# Patient Record
Sex: FEMALE | Race: ASIAN | Employment: OTHER | URBAN - METROPOLITAN AREA
[De-identification: names, ages, dates, MRNs, and addresses within clinical notes are randomized per-mention and may not be internally consistent; named-entity substitution may affect disease eponyms.]

---

## 2017-08-21 ENCOUNTER — HOSPITAL ENCOUNTER (OUTPATIENT)
Dept: PHYSICAL THERAPY | Age: 72
Discharge: HOME | End: 2017-08-21
Payer: MEDICARE

## 2017-08-21 PROCEDURE — 97110 THERAPEUTIC EXERCISES: CPT | Performed by: PHYSICAL THERAPIST

## 2017-08-21 PROCEDURE — 97162 PT EVAL MOD COMPLEX 30 MIN: CPT | Performed by: PHYSICAL THERAPIST

## 2017-08-21 PROCEDURE — G8978 MOBILITY CURRENT STATUS: HCPCS | Performed by: PHYSICAL THERAPIST

## 2017-08-21 PROCEDURE — G8979 MOBILITY GOAL STATUS: HCPCS | Performed by: PHYSICAL THERAPIST

## 2017-08-21 NOTE — PROGRESS NOTES
PT DAILY TREATMENT NOTE/LUMBAR EVAL 3-16    Patient Name: Jonelle Farooq  Date:2017  : 1945  [x]  Patient  Verified  Payor: [place] MED[place] / Plan: [place] MEDICARE PART A & B / Product Type: Medicare /    In time:11:06  Out time:11:51  Total Treatment Time (min): 45  Total Timed Codes (min): 45  1:1 Treatment Time ( only): 39   Visit #: 1 of 12    Treatment Area: Low back pain [M54.5]  SUBJECTIVE  Pain Level (0-10 scale): 5 (ranges from 3-8)  [x]constant []intermittent []improving []worsening []no change since onset    Any medication changes, allergies to medications, adverse drug reactions, diagnosis change, or new procedure performed?: [x] No    [] Yes (see summary sheet for update)  Subjective functional status/changes:     PLOF: Independent, used to walk for exercises. Limitations to PLOF: Unable to stand or walk for extended periods secondary to increase in pain bilateral feet and low back. Mechanism of Injury: fell down the stairs a year ago and had a fracture at L4 that is healed. However, feet pain began this past December. Current symptoms/Complaints:   -Standing increases back pain and feet pain. - Radiates down the front of legs  - Left side > right. - Toothache, deep ache. - Numbness and tingling positive  - 2 disc herniations and fracture at L4.  - Back pain began following a fall down stairs 2016 however feet pain began in 2016.   -Has not seen an MD yet for this issue, they just read MRI results and said it was stemming from the low back.  -Patient reports that laying down or sitting will decrease back pain.   -Foot pain is constant. Previous Treatment/Compliance: Never had PT before for back. PMHx/Surgical Hx:  39 years ago. Work Hx: Patient takes care of grandchildren and does housework. Living Situation: Lives with   Pt Goals: \"To decrease pain levels. \"  Barriers: [x]pain []financial []time []transportation []other  Motivation: Good  Substance use: []Alcohol []Tobacco []other:   FABQ Score: []low [x]elevate  Cognition: A & O x 4    Other:    OBJECTIVE/EXAMINATION  Activity/Recreational Limitations: Taking care of grandchildren, house work. 35 min [x]Eval                  []Re-Eval       15 min Therapeutic Exercise:  [x] See flow sheet :   Rationale: increase ROM, increase strength and improve coordination to improve the patients ability to perform daily activities with decreased pain and symptom levels           With   [x] TE   [] TA   [] neuro   [] other: Patient Education: [x] Review HEP    [] Progressed/Changed HEP based on:   [] positioning   [x] body mechanics   [] transfers   [] heat/ice application    [] other:      Other Objective/Functional Measures: FOTO: 53    Physical Therapy Evaluation - Lumbar Spine ([facility])      General Health:  Red Flags Indicated? [] Yes    [x] No  [] Yes [] No Recent weight change (If yes, due to dieting? [] Yes  [] No)   [] Yes [] No Weakness in legs during walking  [] Yes [] No Unremitting pain at night  [] Yes [] No Abdominal pain or problems  [] Yes [] No Rectal bleeding  [] Yes [] No Feet more cold or painful in cold weather  [] Yes [] No Menstrual irregularities  [] Yes [] No Blood or pain with urination  [] Yes [] No Dysfunction of bowel or bladder  [] Yes [] No Recent illness within past 3 weeks (i.e, cold, flu)  [] Yes [] No Numbness/tingling in buttock/genitalia region         Diagnostic Tests: [] Lab work [] X-rays    [] CT [x] MRI     [] Other:  Results: Healed lumbar fracture, 2 lumbar disc herniations. OBJECTIVE  Posture:  Lateral Shift: [] R    [x] L     [x] +  [] - (right rib hump observed)  Kyphosis: [x] Increased [] Decreased   []  WNL   Lordosis:  [] Increased [x] Decreased   [] WNL  Pelvic symmetry: [x] WNL    [] Other:    Gait:  [] Normal     [x] Abnormal: Short, shuffled steps.      Active Movements: [] N/A   [] Too acute   [] Other:  ROM % AROM Comments:pain, area Forward flexion 40-60 50% Stiffness in back, tightness in HS. Extension 20-30 25% Increased back pain   SB right 20-30 25%    SB left 20-30 25%    Rotation right 5-10 50%    Rotation left 5-10 25%      Repeated Movements   Effects on present pain: produces (MS), abolishes (A), increases (incr), decreases (decr), centralizes (C), peripheral (PH), no effect (NE)   Pre-Test Sx Flexion Repeated Flexion Extension Repeated Extension   Standing Carlos Feet pain NO change No change No change No change     Comments: No changes in symptoms with repeated motion testing. Neuro Screen [x] WNL  Myotome/Dermatome/Reflexes:   Comments:    Dural Mobility:  SLR Sitting: [] R    [] L    [] +    [x] -  @ (degrees):           Supine: [] R    [] L    [] +    [x] -  @ (degrees):   Slump Test: [] R    [] L    [] +    [x] -  @ (degrees):   Prone Knee Bend: [x] R    [x] L    [x] +    [] -  (tightness)    Palpation  [] Min  [x] Mod  [] Severe    Location: bilateral paraspinals, QL, piriformis  [] Min  [] Mod  [] Severe    Location:  [] Min  [] Mod  [] Severe    Location:    Strength   L(0-5) R (0-5) N/T   Hip Flexion (L1,2) 4- 4- []   Knee Extension (L3,4) 4 4 []   Ankle Dorsiflexion (L4) 4 4 []   Knee Flexion (S1,2) 4 4 []   Back Rotators   []   Gluteus Edward 3+ 3+ []   Hip Abduction 3+ 3+ []     Special Tests  Lumbar:  Lumb. Compression: [x] Pos  [] Neg  Pain in back.                 Sacroilliac:  Gaenslen's: [] R    [] L    [] +    [x] -     Compression: [] +    [x] -     Gapping:  [] +    [x] -     Thigh Thrust: [] R    [] L    [] +    [] -     Leg Length: [] +    [x] -   Position:            Hip: Dousman Shed:  [x] R    [x] L    [x] +    [] - (right > left for tightness)    Scour:  [] R    [] L    [] +    [x] -     Piriformis: [x] R    [x] L    [] +    [] -  (right > left for tightness)    Elys:               Positive bilaterally (left > right)         Deficits: Francesca's: [] R    [] L    [] +    [x] -     Quentin: [x] R    [x] L    [x] + [] -     Hamstrings with knee extended: right 45 degrees left 40 degrees at hip. Global Muscular Weakness:  Abdominals: Bridge, able to complete 5 without increased back pain. Pain Level (0-10 scale) post treatment: 4/10    ASSESSMENT/Changes in Function: Patient is a 80 y/o female who presents to outpatient PT with primary c/o low back pain/stiffness and bilateral foot pain that began approximately a year ago. During evaluation, unable to exacerbate or reduce foot pain based on repeated motion testing of lumbar spine. Therefore, pain in feet may be unrelated to lumbar spine and possibly vascular in nature. However, during evaluation patient demonstrates decreased hip and lumbar spine joint mobility, moderate decreased flexibility in bilateral LE's, decreased core and LE strength and decreased endurance. Patient would benefit from skilled physical therapy to address the above mentioned impairments. Advised patient to follow up with MD regarding foot pain. Patient and  verbalize understanding. [x]  See Plan of Care  []  See progress note/recertification  []  See Discharge Summary         Progress towards goals / Updated goals:  Short Term Goals: STG- To be accomplished in 4 week(s):  1. Pt will be independent with HEP to encourage prophylaxis. Eval:To be issued next session. Current: NA    Long Term Goals: LTG- To be accomplished in 8 week(s):  1. Pt will report a 50% reduction in symptoms during daily activities. Eval:Pain ranges from 3-8/10  Current: NA    2. Pt will be able to walk for >30 minutes to return to daily exercise without increased low back pain. Eval:Unable secondary to bilateral foot pain  Current: NA    3. Pt will increase active lumbar ROM to full range without pain in order to improve mobility during functional tasks.     Eval:  ROM % AROM   Forward flexion 40-60 50%   Extension 20-30 25%   SB right 20-30 25%   SB left 20-30 25%   Rotation right 5-10 50%   Rotation left 5-10 25%     Current: NA    4. Pt FOTO score will increase by 10 points to show improvement in subjective function.   Eval:53  Current: will address at visit 5       PLAN  [x]  Upgrade activities as tolerated     [x]  Continue plan of care  []  Update interventions per flow sheet       []  Discharge due to:_  []  Other:_      Babs Gannon, PT 8/21/2017  11:06 AM

## 2017-08-21 NOTE — PROGRESS NOTES
[facility] at the [address], [place], [address] [facility]  Phone: [phone]      Fax:  [phone]    Plan of Care/ Statement of Necessity for Physical Therapy Services    Patient name: Per Hess Start of Care: 2017   Referral source: Jeni Jefferson MD : 1945    Medical Diagnosis: Low back pain [M54.5]   Onset Date:2016    Treatment Diagnosis: Low back pain, weakness   Prior Hospitalization: see medical history Provider#: [id number]   Medications: Verified on Patient summary List    Comorbidities: Back pain, headaches HBP   Prior Level of Function: Independnet      The Plan of Care and following information is based on the information from the initial evaluation. Assessment/ key information:   Patient is a 80 y/o female who presents to outpatient PT with primary c/o low back pain/stiffness and bilateral foot pain that began approximately a year ago. During evaluation, unable to exacerbate or reduce foot pain based on repeated motion testing of lumbar spine. Therefore, pain in feet may be unrelated to lumbar spine and possibly vascular in nature. However, during evaluation patient demonstrates decreased hip and lumbar spine joint mobility, moderate decreased flexibility in bilateral LE's, decreased core and LE strength and decreased endurance. Patient would benefit from skilled physical therapy to address the above mentioned impairments. Advised patient to follow up with MD regarding foot pain. Patient and  verbalize understanding. Evaluation Complexity History MEDIUM  Complexity : 1-2 comorbidities / personal factors will impact the outcome/ POC ; Examination MEDIUM Complexity : 3 Standardized tests and measures addressing body structure, function, activity limitation and / or participation in recreation  ;Presentation MEDIUM Complexity : Evolving with changing characteristics  ; Clinical Decision Making MEDIUM Complexity : FOTO score of 26-74  Overall Complexity Rating: MEDIUM    Problem List: pain affecting function, decrease ROM, decrease strength, decrease ADL/ functional abilitiies and decrease activity tolerance   Treatment Plan may include any combination of the following: Therapeutic exercise, Therapeutic activities, Neuromuscular re-education, Physical agent/modality, Manual therapy, Patient education, Self Care training and Functional mobility training  Patient / Family readiness to learn indicated by: asking questions  Persons(s) to be included in education: patient (P)  Barriers to Learning/Limitations: None  Patient Goal (s): To eliminate pain  Patient Self Reported Health Status: good  Rehabilitation Potential: good    Short Term Goals: STG- To be accomplished in 4 week(s):  1. Pt will be independent with HEP to encourage prophylaxis. Eval:To be issued next session. Current: NA     Long Term Goals: LTG- To be accomplished in 8 week(s):  1. Pt will report a 50% reduction in symptoms during daily activities. Eval:Pain ranges from 3-8/10  Current: NA     2.  Pt will be able to walk for >30 minutes to return to daily exercise without increased low back pain. Eval:Unable secondary to bilateral foot pain  Current: NA     3.  Pt will increase active lumbar ROM to full range without pain in order to improve mobility during functional tasks. Eval:  ROM % AROM   Forward flexion 40-60 50%   Extension 20-30 25%   SB right 20-30 25%   SB left 20-30 25%   Rotation right 5-10 50%   Rotation left 5-10 25%      Current: NA     4. Pt FOTO score will increase by 10 points to show improvement in subjective function. Eval:53  Current: will address at visit 5      Frequency / Duration: Patient to be seen 2 times per week for 6 weeks.     Patient/ Caregiver education and instruction: Diagnosis, prognosis, self care and exercises   [x]  Plan of care has been reviewed with CATHI    G-Codes (GP)  Mobility   Current  CK= 40-59%   Goal  CJ= 20-39%    The severity rating is based on clinical judgment and the FOTO score. Certification Period: 8/21/17-10/21/17    Rylee Jo, PT 8/21/2017 2:21 PM  _____________________________________________________________________  I certify that the above Therapy Services are being furnished while the patient is under my care. I agree with the treatment plan and certify that this therapy is necessary.     Physician's Signature:____________________  Date:__________Time:______    Please sign and return to   [facility] at the [address], [place], [address] [facility]  Phone: [phone]      Fax:  [phone]

## 2017-08-24 ENCOUNTER — HOSPITAL ENCOUNTER (OUTPATIENT)
Dept: PHYSICAL THERAPY | Age: 72
Discharge: HOME | End: 2017-08-24
Payer: MEDICARE

## 2017-08-24 PROCEDURE — 97112 NEUROMUSCULAR REEDUCATION: CPT | Performed by: PHYSICAL THERAPIST

## 2017-08-24 PROCEDURE — 97110 THERAPEUTIC EXERCISES: CPT | Performed by: PHYSICAL THERAPIST

## 2017-08-24 PROCEDURE — 97530 THERAPEUTIC ACTIVITIES: CPT | Performed by: PHYSICAL THERAPIST

## 2017-08-28 ENCOUNTER — HOSPITAL ENCOUNTER (OUTPATIENT)
Dept: PHYSICAL THERAPY | Age: 72
Discharge: HOME | End: 2017-08-28
Payer: MEDICARE

## 2017-08-28 PROCEDURE — 97110 THERAPEUTIC EXERCISES: CPT | Performed by: PHYSICAL THERAPIST

## 2017-08-28 PROCEDURE — 97112 NEUROMUSCULAR REEDUCATION: CPT | Performed by: PHYSICAL THERAPIST

## 2017-08-28 PROCEDURE — 97530 THERAPEUTIC ACTIVITIES: CPT | Performed by: PHYSICAL THERAPIST

## 2017-08-28 NOTE — PROGRESS NOTES
PT DAILY TREATMENT NOTE - [facility]     Patient Name: Tita Sargent  Date:2017  : 1945  [x]  Patient  Verified  Payor: Blair Pore / Plan: [place] MEDICARE PART A & B / Product Type: Medicare /    In time:2:05  Out time:3:00  Total Treatment Time (min): 55  Total Timed Codes (min): 55  1:1 Treatment Time ( [address] only): 54   Visit #: 3 of 12    Treatment Area: Low back pain [M54.5]    SUBJECTIVE  Pain Level (0-10 scale): 5/10  Any medication changes, allergies to medications, adverse drug reactions, diagnosis change, or new procedure performed?: [x] No    [] Yes (see summary sheet for update)  Subjective functional status/changes:   [] No changes reported  \"I am just feeling sick today. \"    OBJECTIVE    18 min Therapeutic Exercise:  [x] See flow sheet :   Rationale: increase ROM, increase strength and improve coordination to improve the patients ability to perform daily activities with decreased pain and symptom levels        12 min Therapeutic Activity:  [x]  See flow sheet :   Rationale: increase ROM, increase strength and improve coordination  to improve the patients ability to perform daily activities with decreased pain and symptom levels         25 min Neuromuscular Re-education:  [x]  See flow sheet : Patient required tactile cuing for majority of exercises for correct form. Rationale: increase ROM, increase strength and improve coordination  to improve the patients ability to perform daily activities with decreased pain and symptom levels         With   [x] TE   [] TA   [] neuro   [] other: Patient Education: [x] Review HEP    [x] Progressed/Changed HEP based on:   [] positioning   [] body mechanics   [] transfers   [] heat/ice application    [] other:         Other Objective/Functional Measures: Patient with improved form during PPT but still requires tactile cues.       Pain Level (0-10 scale) post treatment: 5/10    ASSESSMENT/Changes in Function: Patient reported less pain in feet since initial evaluation. Patient will continue to benefit from skilled PT services to modify and progress therapeutic interventions, address functional mobility deficits, address ROM deficits, address strength deficits, analyze and address soft tissue restrictions, analyze and cue movement patterns, analyze and modify body mechanics/ergonomics and assess and modify postural abnormalities to attain remaining goals. []  See Plan of Care  []  See progress note/recertification  []  See Discharge Summary         Progress towards goals / Updated goals:  Short Term Goals: STG- To be accomplished in 4 week(s):  1.  Pt will be independent with HEP to encourage prophylaxis. Eval:To be issued next session. Current: NA      Long Term Goals: LTG- To be accomplished in 8 week(s):  1.  Pt will report a 50% reduction in symptoms during daily activities. Eval:Pain ranges from 3-8/10  Current: NA      2.  Pt will be able to walk for >30 minutes to return to daily exercise without increased low back pain. Eval:Unable secondary to bilateral foot pain  Current: NA      3.  Pt will increase active lumbar ROM to full range without pain in order to improve mobility during functional tasks.    Eval:  ROM % AROM   Forward flexion 40-60 50%   Extension 20-30 25%   SB right 20-30 25%   SB left 20-30 25%   Rotation right 5-10 50%   Rotation left 5-10 25%       Current: NA      4.  Pt FOTO score will increase by 10 points to show improvement in subjective function.   Eval:53  Current: will address at visit 5      PLAN  [x]  Upgrade activities as tolerated     [x]  Continue plan of care  []  Update interventions per flow sheet       []  Discharge due to:_  []  Other:_      Bryant Apley, PT 8/28/2017  2:30 PM    Future Appointments  Date Time Provider [place]   8/31/2017 10:00 AM Bryant Apley, PT [id number]

## 2017-08-31 ENCOUNTER — HOSPITAL ENCOUNTER (OUTPATIENT)
Dept: PHYSICAL THERAPY | Age: 72
Discharge: HOME | End: 2017-08-31
Payer: MEDICARE

## 2017-08-31 PROCEDURE — 97110 THERAPEUTIC EXERCISES: CPT | Performed by: PHYSICAL THERAPIST

## 2017-08-31 PROCEDURE — 97530 THERAPEUTIC ACTIVITIES: CPT | Performed by: PHYSICAL THERAPIST

## 2017-08-31 NOTE — PROGRESS NOTES
PT DAILY TREATMENT NOTE - [facility]     Patient Name: Jeffrey Villavicencio  Date:2017  : 1945  [x]  Patient  Verified  Payor: [place] MEDICARE / Plan: [place] MEDICARE PART A & B / Product Type: Medicare /    In time:10:05  Out time:11:08  Total Treatment Time (min): 63  Total Timed Codes (min): 63  1:1 Treatment Time ( [address] only): 39   Visit #: 4 of 12    Treatment Area: Low back pain [M54.5]    SUBJECTIVE  Pain Level (0-10 scale): 5/10  Any medication changes, allergies to medications, adverse drug reactions, diagnosis change, or new procedure performed?: [x] No    [] Yes (see summary sheet for update)  Subjective functional status/changes:   [] No changes reported  \"I had to do a lot of yard work yesterday so my back is pretty sore. \"    OBJECTIVE    35 min Therapeutic Exercise:  [x] See flow sheet :   Rationale: increase ROM, increase strength and improve coordination to improve the patients ability to perform daily activities with decreased pain and symptom levels      10 min Therapeutic Activity:  [x]  See flow sheet :   Rationale: increase ROM, increase strength and improve coordination  to improve the patients ability to perform daily activities with decreased pain and symptom levels            With   [] TE   [] TA   [] neuro   [] other: Patient Education: [] Review HEP    [x] Progressed/Changed HEP based on:   [] positioning   [] body mechanics   [] transfers   [] heat/ice application    [] other:      Other Objective/Functional Measures: Continues to require tactile cues for PPT     Pain Level (0-10 scale) post treatment: 3/10    ASSESSMENT/Changes in Function: Patient continues to demonstrate decreased mobility in hips and lumbar spine.      Patient will continue to benefit from skilled PT services to modify and progress therapeutic interventions, address functional mobility deficits, address ROM deficits, address strength deficits, analyze and address soft tissue restrictions, analyze and cue movement patterns, analyze and modify body mechanics/ergonomics and assess and modify postural abnormalities to attain remaining goals. []  See Plan of Care  []  See progress note/recertification  []  See Discharge Summary         Progress towards goals / Updated goals:  Short Term Goals: STG- To be accomplished in 4 week(s):  1.  Pt will be independent with HEP to encourage prophylaxis. Eval:To be issued next session. Current: NA      Long Term Goals: LTG- To be accomplished in 8 week(s):  1.  Pt will report a 50% reduction in symptoms during daily activities. Eval:Pain ranges from 3-8/10  Current: NA      2.  Pt will be able to walk for >30 minutes to return to daily exercise without increased low back pain. Eval:Unable secondary to bilateral foot pain  Current: NA      3.  Pt will increase active lumbar ROM to full range without pain in order to improve mobility during functional tasks.    Eval:  ROM % AROM   Forward flexion 40-60 50%   Extension 20-30 25%   SB right 20-30 25%   SB left 20-30 25%   Rotation right 5-10 50%   Rotation left 5-10 25%       Current: NA      4.  Pt FOTO score will increase by 10 points to show improvement in subjective function.   Eval:53  Current: will address at visit 5      PLAN  [x]  Upgrade activities as tolerated     [x]  Continue plan of care  []  Update interventions per flow sheet       []  Discharge due to:_  []  Other:_      Noe Eddy PT 8/31/2017  1:25 PM    Future Appointments  Date Time Provider [place]   9/6/2017 11:30 AM GIOVANI Frausto [facility]   9/8/2017 11:30 AM GIOVANI Frausto [facility]   9/12/2017 10:00 AM GIOVANI Frausto [facility]   9/14/2017 11:30 AM GIOVANI Frausto [facility]   9/19/2017 10:00 AM GIOVANI Frausto [facility]   9/22/2017 10:30 AM GIOVANI Frausto [facility]   9/26/2017 10:00 AM GIOVANI Frausto [facility]   9/29/2017 10:30 AM Noe Eddy PT MIJULIANO [facility]

## 2017-09-06 ENCOUNTER — HOSPITAL ENCOUNTER (OUTPATIENT)
Dept: PHYSICAL THERAPY | Age: 72
Discharge: HOME | End: 2017-09-06
Payer: MEDICARE

## 2017-09-06 PROCEDURE — 97530 THERAPEUTIC ACTIVITIES: CPT | Performed by: PHYSICAL THERAPIST

## 2017-09-06 PROCEDURE — 97110 THERAPEUTIC EXERCISES: CPT | Performed by: PHYSICAL THERAPIST

## 2017-09-08 ENCOUNTER — HOSPITAL ENCOUNTER (OUTPATIENT)
Dept: PHYSICAL THERAPY | Age: 72
Discharge: HOME | End: 2017-09-08
Payer: MEDICARE

## 2017-09-08 PROCEDURE — 97110 THERAPEUTIC EXERCISES: CPT | Performed by: PHYSICAL THERAPIST

## 2017-09-08 NOTE — PROGRESS NOTES
PT DAILY TREATMENT NOTE - [facility]       Patient Name: Carmina Barba  Date:2017  : 1945  [x]  Patient  Verified  Payor: [place] MED[place]ARE / Plan: [place] MEDICARE PART A & B / Product Type: Medicare /    In time: 11:30 am  Out time:12:36 pm  Total Treatment Time (min): 66  Total Timed Codes (min): 66  1:1 Treatment Time ( [address] only): 25  Visit #: 6 of 12      Treatment Area: Low back pain [M54.5]      SUBJECTIVE  Pain Level (0-10 scale): 5/10  Any medication changes, allergies to medications, adverse drug reactions, diagnosis change, or new procedure performed?: [x] No    [] Yes (see summary sheet for update)  Subjective functional status/changes:   [] No changes reported  \"I am starting to have less pain in my back and my feet so I think the therapy is helping. \"      OBJECTIVE      25 min Therapeutic Exercise:  [x] See flow sheet :   Rationale: increase ROM, increase strength and improve coordination to improve the patients ability to perform daily activities with decreased pain and symptom levels         With   [] TE   [] TA   [] neuro   [] other: Patient Education: [] Review HEP    [x] Progressed/Changed HEP based on:   [] positioning   [] body mechanics   [] transfers   [] heat/ice application    [] other:        Other Objective/Functional Measures: Patient demonstrates stiffness during open book stretch. Still with difficulty during cat/camel and PPT      Pain Level (0-10 scale) post treatment: 6/10      ASSESSMENT/Changes in Function: Patient continues to demonstrate decreased mobility in hips and lumbar spine.  Patient reports improvement in pain.       Patient will continue to benefit from skilled PT services to modify and progress therapeutic interventions, address functional mobility deficits, address ROM deficits, address strength deficits, analyze and address soft tissue restrictions, analyze and cue movement patterns, analyze and modify body mechanics/ergonomics and assess and modify postural abnormalities to attain remaining goals.      []  See Plan of Care  []  See progress note/recertification  []  See Discharge Summary      Progress towards goals / Updated goals:  Short Term Goals: STG- To be accomplished in 4 week(s):  1.  Pt will be independent with HEP to encourage prophylaxis. Eval:To be issued next session. Current: Patient reports compliance with current program.       Long Term Goals: LTG- To be accomplished in 8 week(s):  1.  Pt will report a 50% reduction in symptoms during daily activities. Eval:Pain ranges from 3-8/10  Current: 10% reduction in symptoms.       2.  Pt will be able to walk for >30 minutes to return to daily exercise without increased low back pain. Eval:Unable secondary to bilateral foot pain  Current: NA      3.  Pt will increase active lumbar ROM to full range without pain in order to improve mobility during functional tasks.    Eval:  ROM % AROM   Forward flexion 40-60 50%   Extension 20-30 25%   SB right 20-30 25%   SB left 20-30 25%   Rotation right 5-10 50%   Rotation left 5-10 25%       Current: NA      4.  Pt FOTO score will increase by 10 points to show improvement in subjective function.   Eval:53  Current: will address at visit 5          PLAN  [x]  Upgrade activities as tolerated     [x]  Continue plan of care  []  Update interventions per flow sheet       []  Discharge due to:_  []  Other:_        Jose A Seals PT                    9/8/2017                                        4:05 PM      Future Appointments  Date Time Provider Kimi Burnett   9/12/2017 10:00 AM GIOVANI Iglesias [facility]   9/14/2017 11:30 AM GIOVANI Iglesias [facility]   9/19/2017 10:00 AM GIOVANI Iglesias [facility]   9/22/2017 10:30 AM GIOVANI Iglesias [facility]   9/26/2017 10:00 AM GIOVANI Iglesias [facility]   9/29/2017 10:30 AM GIOVANI Iglesias [facility]

## 2017-09-12 ENCOUNTER — HOSPITAL ENCOUNTER (OUTPATIENT)
Dept: PHYSICAL THERAPY | Age: 72
Discharge: HOME | End: 2017-09-12
Payer: MEDICARE

## 2017-09-12 PROCEDURE — 97110 THERAPEUTIC EXERCISES: CPT | Performed by: PHYSICAL THERAPIST

## 2017-09-12 PROCEDURE — 97112 NEUROMUSCULAR REEDUCATION: CPT | Performed by: PHYSICAL THERAPIST

## 2017-09-12 PROCEDURE — 97530 THERAPEUTIC ACTIVITIES: CPT | Performed by: PHYSICAL THERAPIST

## 2017-09-12 NOTE — PROGRESS NOTES
PT DAILY TREATMENT NOTE - [facility]     Patient Name: Lucy Pollack  Date:2017  : 1945  [x]  Patient  Verified  Payor: Anthony Fraction / Plan: [place] MEDICARE PART A & B / Product Type: Medicare /    In time:10:02  Out time:11:11  Total Treatment Time (min): 69  Total Timed Codes (min): 69  1:1 Treatment Time ( [address] only): 54   Visit #: 7 of 12    Treatment Area: Low back pain [M54.5]    SUBJECTIVE  Pain Level (0-10 scale): 4/10  Any medication changes, allergies to medications, adverse drug reactions, diagnosis change, or new procedure performed?: [x] No    [] Yes (see summary sheet for update)  Subjective functional status/changes:   [] No changes reported  \"Back pain is feeling better; feet is about the same today. \"    OBJECTIVE      30 min Therapeutic Exercise:  [x] See flow sheet :   Rationale: increase ROM and increase strength to improve the patients ability to perform daily activities with decreased pain and symptom levels      15 min Therapeutic Activity:  [x]  See flow sheet :   Rationale: increase ROM, increase strength and improve coordination  to improve the patients ability to perform daily activities with decreased pain and symptom levels       10 min Neuromuscular Re-education:  [x]  See flow sheet : VC's for cat/camel   Rationale: increase ROM, increase strength and improve coordination  to improve the patients ability to perform daily activities with decreased pain and symptom levels          With   [] TE   [] TA   [] neuro   [] other: Patient Education: [x] Review HEP    [] Progressed/Changed HEP based on:   [] positioning   [] body mechanics   [] transfers   [] heat/ice application    [] other:      Other Objective/Functional Measures: Patient demonstrates improvement in PPT without VC's  -Limited motion during open book   -FOTO: 61    Pain Level (0-10 scale) post treatment: 4/10    ASSESSMENT/Changes in Function: Patient demonstrates improvement in subjective function per FOTO.    Patient will continue to benefit from skilled PT services to modify and progress therapeutic interventions, address functional mobility deficits, address ROM deficits, address strength deficits, analyze and address soft tissue restrictions, analyze and cue movement patterns, analyze and modify body mechanics/ergonomics and assess and modify postural abnormalities to attain remaining goals. []  See Plan of Care  []  See progress note/recertification  []  See Discharge Summary         Progress towards goals / Updated goals:  Short Term Goals: STG- To be accomplished in 4 week(s):  1.  Pt will be independent with HEP to encourage prophylaxis. Eval:To be issued next session. Current: Patient reports compliance with current program.       Long Term Goals: LTG- To be accomplished in 8 week(s):  1.  Pt will report a 50% reduction in symptoms during daily activities. Eval:Pain ranges from 3-8/10  Current: 10% reduction in symptoms.       2.  Pt will be able to walk for >30 minutes to return to daily exercise without increased low back pain. Eval:Unable secondary to bilateral foot pain  Current: NA      3.  Pt will increase active lumbar ROM to full range without pain in order to improve mobility during functional tasks.    Eval:  ROM % AROM   Forward flexion 40-60 50%   Extension 20-30 25%   SB right 20-30 25%   SB left 20-30 25%   Rotation right 5-10 50%   Rotation left 5-10 25%       Current: NA      4.  Pt FOTO score will increase by 10 points to show improvement in subjective function.   Eval:53  Current: 64      PLAN  [x]  Upgrade activities as tolerated     [x]  Continue plan of care  []  Update interventions per flow sheet       []  Discharge due to:_  []  Other:_      Edwin Linares PT 9/12/2017  10:15 AM    Future Appointments  Date Time Provider Kimi Burnett   9/14/2017 11:30 AM GIOVANI Gibson [facility]   9/19/2017 10:00 AM GIOVANI Gibson [facility]   9/22/2017 10:30 [facility]   9/26/2017 10:00 [facility]   9/29/2017 10:30 AM GIOVANI Lee [facility]

## 2017-09-14 ENCOUNTER — HOSPITAL ENCOUNTER (OUTPATIENT)
Dept: PHYSICAL THERAPY | Age: 72
Discharge: HOME | End: 2017-09-14
Payer: MEDICARE

## 2017-09-14 PROCEDURE — 97530 THERAPEUTIC ACTIVITIES: CPT

## 2017-09-14 PROCEDURE — 97112 NEUROMUSCULAR REEDUCATION: CPT

## 2017-09-14 PROCEDURE — 97110 THERAPEUTIC EXERCISES: CPT

## 2017-09-14 NOTE — PROGRESS NOTES
PT DAILY TREATMENT NOTE - [facility]     Patient Name: Davey Kohler  Date:2017  : 1945  [x]  Patient  Verified  Payor: Franck Cherry / Plan: [place] MEDICARE PART A & B / Product Type: Medicare /    In time:10:05  Out time:11:08  Total Treatment Time (min): 63  Total Timed Codes (min): 63  1:1 Treatment Time ( [address] only): 50   Visit #: 8 of 12    Treatment Area: Low back pain [M54.5]    SUBJECTIVE  Pain Level (0-10 scale): 4  Any medication changes, allergies to medications, adverse drug reactions, diagnosis change, or new procedure performed?: [x] No    [] Yes (see summary sheet for update)  Subjective functional status/changes:   [] No changes reported  \"I walked 2 blocks yesterday with no pan in my feet or back. My back still hurts but getting better. \"    OBJECTIVE    37 min Therapeutic Exercise:  [x] See flow sheet :   Rationale: increase ROM, increase strength and improve coordination to improve the patients ability to complete daily activities with decreased pain and symptom levels. 16 min Therapeutic Activity:  [x]  See flow sheet :   Rationale: increase strength, improve balance and increase proprioception  to improve the patients ability to complete daily activities with decreased pain and symptom levels. 10 min Neuromuscular Re-education:  []  See flow sheet : Tactile cues needed for exercises for proper form and facilitate gluts. Rationale: increase ROM, increase strength, improve coordination and increase proprioception  to improve the patients ability to complete daily activities with decreased pain and symptom levels.            With   [] TE   [] TA   [] neuro   [] other: Patient Education: [x] Review HEP    [] Progressed/Changed HEP based on:   [] positioning   [] body mechanics   [] transfers   [] heat/ice application    [] other:      Other Objective/Functional Measures: Continued dec thoracic mobility noted with open book exercise      Pain Level (0-10 scale) post treatment: 4    ASSESSMENT/Changes in Function: Continues to need verbal and tactile cues for proper form with exercises. Lumbar AROM has improved in all directions with pain only reported with lumbar flexion. Patient will continue to benefit from skilled PT services to modify and progress therapeutic interventions, address functional mobility deficits, address ROM deficits, address strength deficits, analyze and cue movement patterns, analyze and modify body mechanics/ergonomics, assess and modify postural abnormalities and instruct in home and community integration to attain remaining goals. []  See Plan of Care  []  See progress note/recertification  []  See Discharge Summary         Progress towards goals / Updated goals:  Short Term Goals: STG- To be accomplished in 4 week(s):  1.  Pt will be independent with HEP to encourage prophylaxis. Eval:To be issued next session. Current: Patient reports compliance with current program.       Long Term Goals: LTG- To be accomplished in 8 week(s):  1.  Pt will report a 50% reduction in symptoms during daily activities. Eval:Pain ranges from 3-8/10  Current: 10% reduction in symptoms.       2.  Pt will be able to walk for >30 minutes to return to daily exercise without increased low back pain. Eval:Unable secondary to bilateral foot pain  Current: Pt reports walking couple blocks without pain - progressing       3.  Pt will increase active lumbar ROM to full range without pain in order to improve mobility during functional tasks.    Eval:  ROM % AROM   Forward flexion 40-60 50%   Extension 20-30 25%   SB right 20-30 25%   SB left 20-30 25%   Rotation right 5-10 50%   Rotation left 5-10 25%       Current:   ROM % AROM   Forward flexion 40-60 80% p!    Extension 20-30 80%   SB right 20-30 75%   SB left 20-30 75%   Rotation right 5-10 60%   Rotation left 5-10 60%         4.  Pt FOTO score will increase by 10 points to show improvement in subjective function.   Eval:53  Current: 64 - progressing      PLAN  [x]  Upgrade activities as tolerated     [x]  Continue plan of care  []  Update interventions per flow sheet       []  Discharge due to:_  []  Other:_       Veronica Quiroz 9/14/2017  10:07 AM    Future Appointments  Date Time Provider Kimi Burnett   9/14/2017 10:30 AM Veronica Quiroz [facility]   9/19/2017 10:00 AM Francisco Henderson PT [facility]   9/22/2017 10:30 AM Francisco Henderson PT [facility]   9/26/2017 10:00 AM Francisco Henderson PT [facility]   9/29/2017 10:30 AM [facility] Henderson, [facility]

## 2017-09-19 ENCOUNTER — HOSPITAL ENCOUNTER (OUTPATIENT)
Dept: PHYSICAL THERAPY | Age: 72
Discharge: HOME | End: 2017-09-19
Payer: MEDICARE

## 2017-09-19 PROCEDURE — G8979 MOBILITY GOAL STATUS: HCPCS | Performed by: PHYSICAL THERAPIST

## 2017-09-19 PROCEDURE — 97530 THERAPEUTIC ACTIVITIES: CPT | Performed by: PHYSICAL THERAPIST

## 2017-09-19 PROCEDURE — 97110 THERAPEUTIC EXERCISES: CPT | Performed by: PHYSICAL THERAPIST

## 2017-09-19 PROCEDURE — 97112 NEUROMUSCULAR REEDUCATION: CPT | Performed by: PHYSICAL THERAPIST

## 2017-09-19 PROCEDURE — G8978 MOBILITY CURRENT STATUS: HCPCS | Performed by: PHYSICAL THERAPIST

## 2017-09-19 NOTE — PROGRESS NOTES
PT DAILY TREATMENT NOTE - [facility]     Patient Name: Colleen Mauricio  Date:2017  : 1945  [x]  Patient  Verified  Payor: Maye Staff / Plan: [place] MEDICARE PART A & B / Product Type: Medicare /    In time:10:00  Out time:11:02  Total Treatment Time (min): 62  Total Timed Codes (min): 62  1:1 Treatment Time ( [address] only): 48   Visit #: 9 of 12    Treatment Area: Low back pain [M54.5]    SUBJECTIVE  Pain Level (0-10 scale): 4/10  Any medication changes, allergies to medications, adverse drug reactions, diagnosis change, or new procedure performed?: [x] No    [] Yes (see summary sheet for update)  Subjective functional status/changes:   [] No changes reported  \"I am still having the pain in my feet but I feel like my back has loosened up. \"    OBJECTIVE      30 min Therapeutic Exercise:  [x] See flow sheet :   Rationale: increase ROM and increase strength to improve the patients ability to perform daily activities with decreased pain and symptom levels      13 min Therapeutic Activity:  [x]  See flow sheet :   Rationale: increase ROM and increase strength  to improve the patients ability to perform daily activities with decreased pain and symptom levels     10 min Neuromuscular Re-education:  []  See flow sheet : Tactile cues to improve PPT and cat/camel   Rationale: improve coordination  to improve the patients ability to perform daily activities with decreased pain and symptom levels              With   [] TE   [] TA   [] neuro   [] other: Patient Education: [x] Review HEP    [x] Progressed/Changed HEP based on:   [] positioning   [] body mechanics   [] transfers   [] heat/ice application    [] other:      Other Objective/Functional Measures: See below for current measures     Pain Level (0-10 scale) post treatment: 4/10    ASSESSMENT/Changes in Function: Patient has made some progress towards goals however is still having bilateral foot pain which was primary complaint of patient.  Patient has follow up with MD in 2 weeks. Advised patient that we will finish out 3 remaining sessions to total 12 and discharge to [facility]. Patient will continue to benefit from skilled PT services to modify and progress therapeutic interventions, address functional mobility deficits, address ROM deficits, address strength deficits, analyze and address soft tissue restrictions, analyze and cue movement patterns, analyze and modify body mechanics/ergonomics and assess and modify postural abnormalities to attain remaining goals. []  See Plan of Care  [x]  See progress note/recertification  []  See Discharge Summary         Progress towards goals / Updated goals:  Short Term Goals: STG- To be accomplished in 4 week(s):  1.  Pt will be independent with [facility] to encourage prophylaxis. Eval:To be issued next session. Current: Patient reports compliance with current program.       Long Term Goals: LTG- To be accomplished in 8 week(s):  1.  Pt will report a 50% reduction in symptoms during daily activities. Eval:Pain ranges from 3-8/10  Current: 10% reduction in symptoms.       2.  Pt will be able to walk for >30 minutes to return to daily exercise without increased low back pain. Eval:Unable secondary to bilateral foot pain  Current: Pt reports walking couple blocks without pain - progressing       3.  Pt will increase active lumbar ROM to full range without pain in order to improve mobility during functional tasks.    Eval:  ROM % AROM   Forward flexion 40-60 50%   Extension 20-30 25%   SB right 20-30 25%   SB left 20-30 25%   Rotation right 5-10 50%   Rotation left 5-10 25%       Current:   ROM % AROM   Forward flexion 40-60 80% p! Extension 20-30 80%   SB right 20-30 75%   SB left 20-30 75%   Rotation right 5-10 60%   Rotation left 5-10 60%          4.  Pt FOTO score will increase by 10 points to show improvement in subjective function.   Eval:53  Current: 64 - progressing      PLAN  [x]  Upgrade activities as tolerated     [x]  Continue plan of care  []  Update interventions per flow sheet       []  Discharge due to:_  []  Other:_      Andrew Hayden PT 9/19/2017  10:26 AM    Future Appointments  Date Time Provider Kimi Burnett   9/22/2017 10:00 AM Vi FRAGOSO [facility]   9/26/2017 10:00 AM GIOVANI Osullivan [facility]   9/29/2017 10:30 AM GIOVANI Osullivan [facility]

## 2017-09-19 NOTE — PROGRESS NOTES
[facility] at the [address], [place] Kimi brookserson, [address] [facility]  Phone: [phone]      Fax:  [phone]        Medicare Progress Report    Patient name: Maggie Rodriguez Start of Care: 2017   Referral source: Adolph Hamilton MD : 1945                         Medical Diagnosis: Low back pain [M54.5] Onset Date:2016                         Treatment Diagnosis: Low back pain, weakness   Prior Hospitalization: see medical history Provider#: [id number]   Medications: Verified on Patient summary List    Comorbidities: Back pain, headaches HBP   Prior Level of Function: Independnet    Visits from Start of Care:     Missed Visits: 0    Reporting Period: 17 to 17    Subjective Reports: I am still having the pain in my feet but I feel like my back has loosened up. Key functional changes:   Short Term Goals: STG- To be accomplished in 4 week(s):  1.  Pt will be independent with HEP to encourage prophylaxis. Eval:To be issued next session. Current: Patient reports compliance with current program.       Long Term Goals: LTG- To be accomplished in 8 week(s):  1.  Pt will report a 50% reduction in symptoms during daily activities. Eval:Pain ranges from 3-8/10  Current: 10% reduction in symptoms.       2.  Pt will be able to walk for >30 minutes to return to daily exercise without increased low back pain. Eval:Unable secondary to bilateral foot pain  Current: Pt reports walking couple blocks without pain - progressing       3.  Pt will increase active lumbar ROM to full range without pain in order to improve mobility during functional tasks.    Eval:  ROM % AROM   Forward flexion 40-60 50%   Extension 20-30 25%   SB right 20-30 25%   SB left 20-30 25%   Rotation right 5-10 50%   Rotation left 5-10 25%       Current:   ROM % AROM   Forward flexion 40-60 80% p!    Extension 20-30 80%   SB right 20-30 75%   SB left 20-30 75%   Rotation right 5-10 60%   Rotation left 5-10 60%           4.  Pt FOTO score will increase by 10 points to show improvement in subjective function. Eval:53  Current: 64 - progressing        Problems/ barriers to goal attainment: Language barrier. Assessment / Recommendations:Patient has made some progress towards goals however is still having bilateral foot pain which was primary complaint of patient. Patient has follow up with MD in 2 weeks. Advised patient that we will finish out 3 remaining sessions to total 12 and discharge to [facility].    Patient will continue to benefit from skilled PT services to modify and progress therapeutic interventions, address functional mobility deficits, address ROM deficits, address strength deficits, analyze and address soft tissue restrictions, analyze and cue movement patterns, analyze and modify body mechanics/ergonomics and assess and modify postural abnormalities to attain remaining goals. Problem List: pain affecting function, decrease ROM, decrease strength, edema affecting function, decrease ADL/ functional abilitiies, decrease activity tolerance and decrease flexibility/ joint mobility   Treatment Plan: Therapeutic exercise, Therapeutic activities, Neuromuscular re-education, Physical agent/modality, Manual therapy, Patient education, Self Care training and Functional mobility training         Updated Goals to be accomplished in 2 weeks:  Continue as above    Frequency / Duration: Patient to be seen 3 more sessions to finish out original prescription. G-Codes (GP)  Mobility  [id number] Current  CJ= 20-39%  [id number] Goal  CJ= 20-39%    The severity rating is based on clinical judgement and the FOTO score.       Miguel Angel Pillai, PT 9/19/2017 11:07 AM

## 2017-09-22 ENCOUNTER — HOSPITAL ENCOUNTER (OUTPATIENT)
Dept: PHYSICAL THERAPY | Age: 72
Discharge: HOME | End: 2017-09-22
Payer: MEDICARE

## 2017-09-22 PROCEDURE — 97110 THERAPEUTIC EXERCISES: CPT

## 2017-09-22 PROCEDURE — 97112 NEUROMUSCULAR REEDUCATION: CPT

## 2017-09-22 NOTE — PROGRESS NOTES
PT DAILY TREATMENT NOTE     Patient Name: Torin Nielsen  Date:2017  : 1945  [x]  Patient  Verified  Payor: [place] MED[place] / Plan: [place] MED[place]ARE PART A & B / Product Type: Medicare /    In time:10:45  Out time:11:34  Total Treatment Time (min): 52  Visit #: 10 of 12    Treatment Area: Low back pain [M54.5]    SUBJECTIVE  Pain Level (0-10 scale): 4  Any medication changes, allergies to medications, adverse drug reactions, diagnosis change, or new procedure performed?: [x] No    [] Yes (see summary sheet for update)  Subjective functional status/changes:   [] No changes reported  \"Sometimes it hurts sometimes it doesn't. \"    OBJECTIVE  30 min Therapeutic Exercise:  [x] See flow sheet :   Rationale: increase ROM, increase strength and improve coordination to improve the patients ability to complete daily activities with decreased pain and symptom levels. 10 min Therapeutic Activity:  [x]  See flow sheet :   Rationale: increase ROM, increase strength, improve coordination and increase proprioception  to improve the patients ability to complete daily activities with decreased pain and symptom levels. 9 min Neuromuscular Re-education:  []  See flow sheet : Tactile cues to improve form with cat/camel, open book, clamshells and 3 way hip   Rationale: improve coordination  to improve the patients ability to perform daily activities with decreased pain and symptom levels     With   [] TE   [] TA   [] neuro   [] other: Patient Education: [x] Review HEP    [] Progressed/Changed HEP based on:   [] positioning   [] body mechanics   [] transfers   [] heat/ice application    [] other:      Other Objective/Functional Measures: glut inhibition with prone hip extensions      Pain Level (0-10 scale) post treatment: 0    ASSESSMENT/Changes in Function: Pt reports pain comes and goes but is getting better. Improved thoracic mobility with cat/camel today.  Added standing 3 way hip today to increase glut strength. Patient will continue to benefit from skilled PT services to modify and progress therapeutic interventions, address functional mobility deficits, address ROM deficits, address strength deficits, analyze and modify body mechanics/ergonomics and instruct in home and community integration to attain remaining goals. []  See Plan of Care  []  See progress note/recertification  []  See Discharge Summary         Progress towards goals / Updated goals:  Short Term Goals: STG- To be accomplished in 4 week(s):  1.  Pt will be independent with HEP to encourage prophylaxis. Eval:To be issued next session. Current: Patient reports compliance with current program.       Long Term Goals: LTG- To be accomplished in 8 week(s):  1.  Pt will report a 50% reduction in symptoms during daily activities. Eval:Pain ranges from 3-8/10  Current: 10% reduction in symptoms.       2.  Pt will be able to walk for >30 minutes to return to daily exercise without increased low back pain. Eval:Unable secondary to bilateral foot pain  Current: Pt reports walking couple blocks without pain - progressing       3.  Pt will increase active lumbar ROM to full range without pain in order to improve mobility during functional tasks.    Eval:  ROM % AROM   Forward flexion 40-60 50%   Extension 20-30 25%   SB right 20-30 25%   SB left 20-30 25%   Rotation right 5-10 50%   Rotation left 5-10 25%       Current:   ROM % AROM   Forward flexion 40-60 80% p! Extension 20-30 80%   SB right 20-30 75%   SB left 20-30 75%   Rotation right 5-10 60%   Rotation left 5-10 60%           4.  Pt FOTO score will increase by 10 points to show improvement in subjective function.   Eval:53  Current: 64 - progressing      PLAN  [x]  Upgrade activities as tolerated     [x]  Continue plan of care  []  Update interventions per flow sheet       []  Discharge due to:_  []  Other:_      [facility] 9/22/2017  10:47 AM    Future Appointments  Date Time Provider Kimi Burnett   9/26/2017 10:00 AM GIOVANI Diaz [facility]   9/29/2017 10:30 AM GIOVANI Diaz [facility]

## 2017-09-26 ENCOUNTER — HOSPITAL ENCOUNTER (OUTPATIENT)
Dept: PHYSICAL THERAPY | Age: 72
Discharge: HOME | End: 2017-09-26
Payer: MEDICARE

## 2017-09-26 PROCEDURE — 97530 THERAPEUTIC ACTIVITIES: CPT | Performed by: PHYSICAL THERAPIST

## 2017-09-26 PROCEDURE — 97112 NEUROMUSCULAR REEDUCATION: CPT | Performed by: PHYSICAL THERAPIST

## 2017-09-26 PROCEDURE — 97110 THERAPEUTIC EXERCISES: CPT | Performed by: PHYSICAL THERAPIST

## 2017-09-26 NOTE — PROGRESS NOTES
PT DAILY TREATMENT NOTE - [facility]     Patient Name: Cielo Martin  Date:2017  : 1945  [x]  Patient  Verified  Payor: Lisa Callahan / Plan: [place] MEDICARE PART A & B / Product Type: Medicare /    In time:10:00  Out time:10:54  Total Treatment Time (min): 54  Total Timed Codes (min): 54  1:1 Treatment Time ( [address] only): 47   Visit #: 11 of 12    Treatment Area: Low back pain [M54.5]    SUBJECTIVE  Pain Level (0-10 scale): 4/10  Any medication changes, allergies to medications, adverse drug reactions, diagnosis change, or new procedure performed?: [x] No    [] Yes (see summary sheet for update)  Subjective functional status/changes:   [] No changes reported  \"I am doing alright today. \"    OBJECTIVE      30 min Therapeutic Exercise:  [x] See flow sheet :   Rationale: increase ROM and increase strength to improve the patients ability to perform daily activities with decreased pain and symptom levels      10 min Therapeutic Activity:  [x]  See flow sheet :   Rationale: increase ROM and increase strength  to improve the patients ability to perform daily activities with decreased pain and symptom levels       14 min Neuromuscular Re-education:  [x]  See flow sheet : tactile cues for cat camel, PPT, clams, bridges and open book. Rationale: improve coordination  to improve the patients ability to perform daily activities with decreased pain and symptom levels              With   [x] TE   [] TA   [] neuro   [] other: Patient Education: [x] Review HEP    [] Progressed/Changed HEP based on:   [] positioning   [x] body mechanics   [] transfers   [] heat/ice application    [] other:      Other Objective/Functional Measures:   -Patient is independent with current exercise program.      Pain Level (0-10 scale) post treatment: 3/10    ASSESSMENT/Changes in Function: Patient has demonstrated improvement in ROM and flexibility however continues to have bilateral foot pain.  Patient demonstrates improvement in motor control during PPT. Patient will continue to benefit from skilled PT services to modify and progress therapeutic interventions, address functional mobility deficits, address ROM deficits, address strength deficits, analyze and address soft tissue restrictions, analyze and cue movement patterns and analyze and modify body mechanics/ergonomics to attain remaining goals. []  See Plan of Care  []  See progress note/recertification  []  See Discharge Summary         Progress towards goals / Updated goals:  Short Term Goals: STG- To be accomplished in 4 week(s):  1.  Pt will be independent with HEP to encourage prophylaxis. Eval:To be issued next session. Current: Patient reports compliance with current program.       Long Term Goals: LTG- To be accomplished in 8 week(s):  1.  Pt will report a 50% reduction in symptoms during daily activities. Eval:Pain ranges from 3-8/10  Current: 10% reduction in symptoms.       2.  Pt will be able to walk for >30 minutes to return to daily exercise without increased low back pain. Eval:Unable secondary to bilateral foot pain  Current: Pt reports walking couple blocks without pain - progressing       3.  Pt will increase active lumbar ROM to full range without pain in order to improve mobility during functional tasks.    Eval:  ROM % AROM   Forward flexion 40-60 50%   Extension 20-30 25%   SB right 20-30 25%   SB left 20-30 25%   Rotation right 5-10 50%   Rotation left 5-10 25%       Current:   ROM % AROM   Forward flexion 40-60 80% p! Extension 20-30 80%   SB right 20-30 75%   SB left 20-30 75%   Rotation right 5-10 60%   Rotation left 5-10 60%           4.  Pt FOTO score will increase by 10 points to show improvement in subjective function. Eval:53  Current: 64 - progressing      PLAN  [x]  Upgrade activities as tolerated     [x]  Continue plan of care  []  Update interventions per flow sheet       []  Discharge due to:_  [x]  Other:_   Discharge next visit.      Anaid Zendejas [facility] PT 9/26/2017  10:11 AM    Future Appointments  Date Time Provider Kimi Burnett   9/29/2017 10:30 AM Rylee Jo, PT [id number]

## 2017-09-29 ENCOUNTER — APPOINTMENT (OUTPATIENT)
Dept: PHYSICAL THERAPY | Age: 72
End: 2017-09-29
Payer: MEDICARE

## 2017-10-03 ENCOUNTER — APPOINTMENT (OUTPATIENT)
Dept: PHYSICAL THERAPY | Age: 72
End: 2017-10-03
Payer: MEDICARE

## 2017-10-24 ENCOUNTER — HOSPITAL ENCOUNTER (OUTPATIENT)
Dept: PHYSICAL THERAPY | Age: 72
Discharge: HOME | End: 2017-10-24
Payer: MEDICARE

## 2017-10-24 PROCEDURE — G8980 MOBILITY D/C STATUS: HCPCS | Performed by: PHYSICAL THERAPIST

## 2017-10-24 PROCEDURE — 97530 THERAPEUTIC ACTIVITIES: CPT | Performed by: PHYSICAL THERAPIST

## 2017-10-24 PROCEDURE — G8979 MOBILITY GOAL STATUS: HCPCS | Performed by: PHYSICAL THERAPIST

## 2017-10-24 PROCEDURE — 97110 THERAPEUTIC EXERCISES: CPT | Performed by: PHYSICAL THERAPIST

## 2017-10-24 NOTE — PROGRESS NOTES
PT DISCHARGE DAILY NOTE AND [id number]    Date:10/24/2017  Patient name: Ankur Real Start of Care: 2017   Referral source: Yolande Collins MD : 1945                         Medical Diagnosis: Low back pain [M54.5] Onset Date:2016                         Treatment Diagnosis: Low back pain, weakness   Prior Hospitalization: see medical history Provider#: [id number]   Medications: Verified on Patient summary List    Comorbidities: Back pain, headaches HBP   Prior Level of Function: Independnet    Visits from Start of Care:     Missed Visits: 0    Reporting Period : 17 to 10/24/17    [x]  Patient  Verified  Payor: [place] MEDICARE / Plan: [place] MEDICARE PART A & B / Product Type: Medicare /    In time:12:30  Out time:1:30  Total Treatment Time (min): 60  Total Timed Codes (min): 60  1:1 Treatment Time ([address] only): 60   Visit #:     SUBJECTIVE  Pain Level (0-10 scale): 4/10  Any medication changes, allergies to medications, adverse drug reactions, diagnosis change, or new procedure performed?: [x] No    [] Yes (see summary sheet for update)  Subjective functional status/changes:   [] No changes reported  \"I think I am ready to be discharged; I know all the exercises. \"    OBJECTIVE      30 min Therapeutic Exercise:  [x] See flow sheet :   Rationale: increase ROM, increase strength and improve coordination to improve the patients ability to perform daily activities with decreased pain and symptom levels      30 min Therapeutic Activity:  [x]  See flow sheet :   Rationale: increase ROM, increase strength and improve coordination  to improve the patients ability to perform daily activities with decreased pain and symptom levels              With   [] TE   [] TA   [] neuro   [] other: Patient Education: [x] Review HEP    [] Progressed/Changed HEP based on:   [] positioning   [] body mechanics   [] transfers   [] heat/ice application    [] other:      Other Objective/Functional Measures: See below for current measurements. Pain Level (0-10 scale) post treatment: 3/10    Summary of Care:  Short Term Goals: STG- To be accomplished in 4 week(s):  1.  Pt will be independent with HEP to encourage prophylaxis. Eval:To be issued next session. Current: Patient reports compliance with current program.       Long Term Goals: LTG- To be accomplished in 8 week(s):  1.  Pt will report a 50% reduction in symptoms during daily activities. Eval:Pain ranges from 3-8/10  Current: 10% reduction in symptoms.       2.  Pt will be able to walk for >30 minutes to return to daily exercise without increased low back pain. Eval:Unable secondary to bilateral foot pain  Current: Pt reports walking couple blocks without pain - progressing       3.  Pt will increase active lumbar ROM to full range without pain in order to improve mobility during functional tasks.    Eval:  ROM % AROM   Forward flexion 40-60 50%   Extension 20-30 25%   SB right 20-30 25%   SB left 20-30 25%   Rotation right 5-10 50%   Rotation left 5-10 25%       Current:   ROM % AROM   Forward flexion 40-60 80% p! Extension 20-30 80%   SB right 20-30 75%   SB left 20-30 75%   Rotation right 5-10 60%   Rotation left 5-10 60%           4.  Pt FOTO score will increase by 10 points to show improvement in subjective function. Eval:53  Current: 64 - progressing    ASSESSMENT/Changes in Function: Patient is a 82 y/o female who presented to outpatient PT with primary complaint of bilateral foot pain and low back pain. Patient has attended 12 PT sessions and has reported a reduction of low back pain but worsening of bilateral foot pain. Patient will be discharged to [facility]. G-Codes (GP)  Mobility   [id number] Goal  CJ= 20-39%  D/C  CJ= 20-39%      The severity rating is based on clinical judgment and the FOTO score.       Thank you for this referral!     PLAN  [x]Discontinue therapy: [x]Patient has reached or is progressing toward set goals      []Patient is non-compliant or has abdicated      []Due to lack of appreciable progress towards set 52 Karthik Peterson, PT 10/24/2017  6:33 PM